# Patient Record
Sex: MALE | Race: BLACK OR AFRICAN AMERICAN | URBAN - METROPOLITAN AREA
[De-identification: names, ages, dates, MRNs, and addresses within clinical notes are randomized per-mention and may not be internally consistent; named-entity substitution may affect disease eponyms.]

---

## 2023-05-17 ENCOUNTER — HOSPITAL ENCOUNTER (OUTPATIENT)
Dept: DATA CONVERSION | Facility: HOSPITAL | Age: 83
End: 2023-05-17
Attending: UROLOGY | Admitting: UROLOGY

## 2023-05-17 DIAGNOSIS — N40.1 BENIGN PROSTATIC HYPERPLASIA WITH LOWER URINARY TRACT SYMPTOMS: ICD-10-CM

## 2023-05-17 DIAGNOSIS — R33.8 OTHER RETENTION OF URINE: ICD-10-CM

## 2023-05-17 DIAGNOSIS — I10 ESSENTIAL (PRIMARY) HYPERTENSION: ICD-10-CM

## 2023-05-17 DIAGNOSIS — R33.9 RETENTION OF URINE, UNSPECIFIED: ICD-10-CM

## 2023-05-17 LAB
ABO GROUP (TYPE) IN BLOOD: NORMAL
ANTIBODY SCREEN: NORMAL
ERYTHROCYTE DISTRIBUTION WIDTH (RATIO) BY AUTOMATED COUNT: 18.6 % (ref 11.5–14.5)
ERYTHROCYTE MEAN CORPUSCULAR HEMOGLOBIN CONCENTRATION (G/DL) BY AUTOMATED: 31 G/DL (ref 32–36)
ERYTHROCYTE MEAN CORPUSCULAR VOLUME (FL) BY AUTOMATED COUNT: 83 FL (ref 80–100)
ERYTHROCYTES (10*6/UL) IN BLOOD BY AUTOMATED COUNT: 3.73 X10E12/L (ref 4.5–5.9)
HEMATOCRIT (%) IN BLOOD BY AUTOMATED COUNT: 29.6 % (ref 41–52)
HEMATOCRIT (%) IN BLOOD BY AUTOMATED COUNT: 31 % (ref 41–52)
HEMOGLOBIN (G/DL) IN BLOOD: 9.2 G/DL (ref 13.5–17.5)
HEMOGLOBIN (G/DL) IN BLOOD: 9.6 G/DL (ref 13.5–17.5)
LEUKOCYTES (10*3/UL) IN BLOOD BY AUTOMATED COUNT: 9 X10E9/L (ref 4.4–11.3)
NRBC (PER 100 WBCS) BY AUTOMATED COUNT: 0 /100 WBC (ref 0–0)
PLATELETS (10*3/UL) IN BLOOD AUTOMATED COUNT: 274 X10E9/L (ref 150–450)
RH FACTOR: NORMAL

## 2023-05-18 LAB
ATRIAL RATE: 75 BPM
P AXIS: 46 DEGREES
P OFFSET: 212 MS
P ONSET: 156 MS
PR INTERVAL: 138 MS
Q ONSET: 225 MS
QRS COUNT: 13 BEATS
QRS DURATION: 84 MS
QT INTERVAL: 380 MS
QTC CALCULATION(BAZETT): 424 MS
QTC FREDERICIA: 409 MS
R AXIS: -16 DEGREES
T AXIS: 5 DEGREES
T OFFSET: 415 MS
VENTRICULAR RATE: 75 BPM

## 2023-05-22 LAB
COMPLETE PATHOLOGY REPORT: NORMAL
CONVERTED CLINICAL DIAGNOSIS-HISTORY: NORMAL
CONVERTED FINAL DIAGNOSIS: NORMAL
CONVERTED FINAL REPORT PDF LINK TO COPY AND PASTE: NORMAL
CONVERTED GROSS DESCRIPTION: NORMAL

## 2023-09-07 VITALS — BODY MASS INDEX: 31.63 KG/M2 | WEIGHT: 201.5 LBS | HEIGHT: 67 IN

## 2023-09-30 NOTE — H&P
History & Physical Reviewed:   I have reviewed the History and Physical dated:  15-May-2023   History and Physical reviewed and relevant findings noted. Patient examined to review pertinent physical  findings.: No significant changes   Home Medications Reviewed: no changes noted   Allergies Reviewed: no changes noted       ERAS (Enhanced Recovery After Surgery):  ·  ERAS Patient: no     Consent:   COVID-19 Consent:  ·  COVID-19 Risk Consent Surgeon has reviewed key risks related to the risk of shirin COVID-19 and if they contract COVID-19 what the risks are.     Attestation:   Note Completion:  I am a:  Resident/Fellow   Attending Attestation I saw and evaluated the patient.  I personally obtained the key and critical portions of the history and physical exam or was physically present for key and  critical portions performed by the resident/fellow. I reviewed the resident/fellow?s documentation and discussed the patient with the resident/fellow.  I agree with the resident/fellow?s medical decision making as documented in the note.     I personally evaluated the patient on 17-May-2023         Electronic Signatures:  Jesusita Jara (Resident))  (Signed 17-May-2023 07:53)   Authored: History & Physical Reviewed, ERAS, Consent,  Note Completion  Sadiq Roque)  (Signed 23-May-2023 07:36)   Authored: Note Completion   Co-Signer: History & Physical Reviewed, ERAS, Consent, Note Completion      Last Updated: 23-May-2023 07:36 by Sadiq Roque)

## 2023-10-02 NOTE — OP NOTE
PROCEDURE DETAILS    Preoperative Diagnosis:  Benign prostatic hyperplasia with lower urinary tract symptoms, N40.1  Retention of urine, unspecified, R33.9    Postoperative Diagnosis:  Benign prostatic hyperplasia with lower urinary tract symptoms, N40.1  Retention of urine, unspecified, R33.9    Surgeon: Sadiq Roque  Resident/Fellow/Other Assistant: Jesusita Jara    Procedure:  1. HOLMIUM LASER ENUCLEATION OF THE PROSTATE    Anesthesia: No anesthesiologist associated with this case  Estimated Blood Loss: 20cc  Findings: modifier 22 for multinodular, massively enlarged gland >200g which added over an hour to the case.  Specimens(s) Collected: yes,  prostate tissue   Drains and/or Catheters: 22fr 3 way, 70cc in balloon        Operative Report:   Total Enucleation Time:   85 minutes   Total Laser Time: 42m34s  Total Energy (kJ): 254.53  Total Morcellation Time: 25 minutes   Laser Settings Used:  Cutting 2 J, 40 Hz.  Coagulation 1.5 J, 30 Hz.   Kei or Virtual basket used? VB  Preoperative Prostate Size: 200 cubic centimeters.     Prostate configuration: Trilobar  Complication: none  EBL: 20 ml  Catheter: 22Fr 3 way  Antibiotics: ancef, gent  Specimen: Morcellated prostatic tissue.    DESCRIPTION OF PROCEDURE:      The patient was brought to OR #[] and after good general anesthesia was achieved, the patient was prepped and draped in dorsal lithotomy position. All pressure points were padded.  Surgical pause was performed.  The patient was identified using 2 identifiers.   The correct surgical procedure was confirmed.  All members of surgical and anesthesia team were in agreement.  The patient received prophylactic antibiotic and the procedure started.    Cystoscopy: The patient's bladder was first entered with a 22-Finnish rigid cystoscope with 30-degree lens.  Cystoscopic examination showed normal anterior urethra.  The posterior urethra showed bilobar enlargement of the prostate with some intravesical   protrusion.  Both ureteral orifices were identified away from the bladder neck.  The rest of the examination was normal.  The cystoscope was removed and the meatus was dilated up to 28-Croatian using sounds.  The urethra was then filled with lidocaine gel  and a 26-Croatian Oquendo continuous-flow resectoscope was placed.  The holmium laser apparatus was placed through the sheath.  Using a 550-micron end-firing quartz laser fiber with 10 cm of cladding stripped off, a laser bridge, and a 7-Croatian laser stabilizing  catheter, the procedure was started with the above-mentioned laser setting.      A en bloc technique was performed with an initial incision at the apex and circumferentially using low energy.  We continued to develop our anterior plane at the apex, identifying the capsule and freeing up the anterior apex well within the sphincter.  We then proceeded with our posterior dissection, developing the posterior space toward the bladder neck and continuing our incision laterally to 9 and 3:00.    Then we turned our attention to the lateral attachments of the prostate.  Using laser energy, taking care to avoid any damage to the sphincter, we connected our previously dissected anterior and posterior planes to completely liberate the apex of the  prostate preserving the sphincter. We then continued our dissection circumferentially, freeing the prostate systematically from apex to its attachments at the bladder neck. The adenoma was then pushed into the bladder.     Once the prostate had been fully enucleated, the laser was defocused on any sources of bleeding to get additional hemostasis.  There was good hemostasis at the conclusion of this procedure.  A few small remaining nodular adenomas were then resected from  the capsule.  Due to significant nodularity and massive size, >1h extra was spent enucleating and cleaning up the fossa    The laser bridge apparatus and the resectoscope were then removed and the offset Oquendo  nephroscope and Oquendo - Prianha tissue morcellator were then introduced and used to completely morcellate the tissue.  Two inflows were used during this portion of the  procedure to fully distend the bladder and to prevent any inadvertent bladder injury.  The bladder was then ellik'd out after insertion of the inner sheath and reinspected with the cystoscope showing no residual adenomas.  Hemostasis was ensured at the  conclusion of the procedure and both ureteral orifices were confirmed and identified well away from the area of resection.  No residual adenoma could be identified.    Following that, a three-way Corral catheter on a guide was placed into the bladder and the balloon was filled.  The bladder was irrigated near clear and the continuous irrigation was started,    The patient tolerated the procedure well and was shifted to recovery in good general condition.                          Electronic Signatures:  Sadiq Roque)  (Signed 17-May-2023 11:39)   Authored: Post-Operative Note, Chart Review, Note Completion      Last Updated: 17-May-2023 11:39 by Sadiq Roque)

## 2023-12-01 ENCOUNTER — TELEPHONE (OUTPATIENT)
Dept: UROLOGY | Facility: HOSPITAL | Age: 83
End: 2023-12-01

## 2024-11-29 ENCOUNTER — APPOINTMENT (OUTPATIENT)
Dept: UROLOGY | Facility: HOSPITAL | Age: 84
End: 2024-11-29
Payer: MEDICARE

## 2024-12-05 ENCOUNTER — APPOINTMENT (OUTPATIENT)
Dept: UROLOGY | Facility: HOSPITAL | Age: 84
End: 2024-12-05
Payer: MEDICARE

## 2025-05-08 ENCOUNTER — APPOINTMENT (OUTPATIENT)
Dept: UROLOGY | Facility: HOSPITAL | Age: 85
End: 2025-05-08
Payer: MEDICARE